# Patient Record
Sex: MALE | Race: BLACK OR AFRICAN AMERICAN | Employment: UNEMPLOYED | URBAN - METROPOLITAN AREA
[De-identification: names, ages, dates, MRNs, and addresses within clinical notes are randomized per-mention and may not be internally consistent; named-entity substitution may affect disease eponyms.]

---

## 2018-09-21 ENCOUNTER — HOSPITAL ENCOUNTER (EMERGENCY)
Age: 48
Discharge: HOME | End: 2018-09-21
Attending: EMERGENCY MEDICINE
Payer: MEDICAID

## 2018-09-21 VITALS
DIASTOLIC BLOOD PRESSURE: 93 MMHG | SYSTOLIC BLOOD PRESSURE: 126 MMHG | HEIGHT: 67 IN | HEART RATE: 88 BPM | OXYGEN SATURATION: 99 % | BODY MASS INDEX: 26.68 KG/M2 | TEMPERATURE: 97.7 F | RESPIRATION RATE: 19 BRPM | WEIGHT: 170 LBS

## 2018-09-21 DIAGNOSIS — G89.29 OTHER CHRONIC PAIN: Primary | ICD-10-CM

## 2018-09-21 DIAGNOSIS — Z76.5 DRUG-SEEKING BEHAVIOR: ICD-10-CM

## 2018-09-21 PROCEDURE — 99283 EMERGENCY DEPT VISIT LOW MDM: CPT

## 2018-09-21 PROCEDURE — 74011250637 HC RX REV CODE- 250/637: Performed by: EMERGENCY MEDICINE

## 2018-09-21 RX ORDER — TRAMADOL HYDROCHLORIDE 50 MG/1
50 TABLET ORAL
Status: COMPLETED | OUTPATIENT
Start: 2018-09-21 | End: 2018-09-21

## 2018-09-21 RX ORDER — TRAMADOL HYDROCHLORIDE 50 MG/1
50 TABLET ORAL
Qty: 10 TAB | Refills: 0 | Status: SHIPPED | OUTPATIENT
Start: 2018-09-21

## 2018-09-21 RX ADMIN — TRAMADOL HYDROCHLORIDE 50 MG: 50 TABLET, FILM COATED ORAL at 12:22

## 2018-09-21 NOTE — DISCHARGE INSTRUCTIONS

## 2018-09-21 NOTE — ED PROVIDER NOTES
EMERGENCY DEPARTMENT HISTORY AND PHYSICAL EXAM 
 
 
Date: 9/21/2018 Patient Name: Moses Hernández History of Presenting Illness Chief Complaint Patient presents with  Pain (Chronic)  
  per pt reports right ankle pain (chronic), denies recent injury. History Provided By: Patient HPI: Moses Hernández, 46 y.o. male with PMHx significant for chronic right ankle pain, presents ambulatory to the ED with cc of chronic right ankle pain for the past 4 years. Pt reports his pain has been worsening recently and he \"needs\" oxycodone for his pain. He reports being seen [facility] ago for the same. He denies any change in his condition and states his current pain is the same as it was for the past 4 years. Pt repeatedly asking for narcotic pain medication in the ED and becomes notably agitated when he was told he would not get oxycodone for his chronic pain. Pt became belligerent for a short period of time and apologized afterwards asking for tramadol. He states \"I need a shot of Toradol and a pill or tramadol today and enough for the next 3 days\". When explained that he can only get a short course of pain medication from the ED pt said, \"well then ill just have to keep coming back for more\". Pt denies any new injury or fall. Social Hx: + Tobacco (everyday smoker), + EtOH (+), - illicit drug use (-) There are no other complaints, changes, or physical findings at this time. PCP: None Past History Past Medical History: 
History reviewed. No pertinent past medical history. Past Surgical History: 
History reviewed. No pertinent surgical history. Family History: 
History reviewed. No pertinent family history. Social History: 
Social History Substance Use Topics  Smoking status: Current Every Day Smoker  Smokeless tobacco: Never Used  Alcohol use Yes Allergies: 
No Known Allergies Review of Systems Review of Systems Constitutional: Negative for chills and fever. HENT: Negative for congestion, rhinorrhea, sneezing and sore throat. Eyes: Negative for redness and visual disturbance. Respiratory: Negative for shortness of breath. Cardiovascular: Negative for chest pain and leg swelling. Gastrointestinal: Negative for abdominal pain, nausea and vomiting. Genitourinary: Negative for difficulty urinating and frequency. Musculoskeletal: Positive for arthralgias (chronic right ankle). Negative for back pain, myalgias and neck stiffness. Skin: Negative for rash. Neurological: Negative for dizziness, syncope, weakness and headaches. Hematological: Negative for adenopathy. All other systems reviewed and are negative. Physical Exam  
Physical Exam  
Constitutional: He is oriented to person, place, and time. He appears well-developed and well-nourished. HENT:  
Head: Normocephalic and atraumatic. Nose: Nose normal.  
Mouth/Throat: Oropharynx is clear and moist.  
Eyes: Conjunctivae and EOM are normal. Pupils are equal, round, and reactive to light. Neck: Normal range of motion. Neck supple. Cardiovascular: Normal rate, regular rhythm, normal heart sounds and intact distal pulses. Pulmonary/Chest: Effort normal and breath sounds normal.  
Abdominal: Soft. Bowel sounds are normal. He exhibits no distension. Musculoskeletal: He exhibits tenderness. He exhibits no deformity. Tenderness to the lateral malleolus of the right ankle with some mild swelling Neurological: He is alert and oriented to person, place, and time. He exhibits normal muscle tone. Skin: Skin is warm and dry. No erythema. Psychiatric: He has a normal mood and affect. His behavior is normal. Judgment and thought content normal.  
 
Medical Decision Making I am the first provider for this patient.  
 
I reviewed the vital signs, available nursing notes, past medical history, past surgical history, family history and social history. Vital Signs-Reviewed the patient's vital signs. Patient Vitals for the past 12 hrs: 
 Temp Pulse Resp BP SpO2  
09/21/18 1115 97.7 °F (36.5 °C) 88 19 (!) 126/93 99 % Records Reviewed: Nursing Notes, Old Medical Records, Previous Radiology Studies and Previous Laboratory Studies Provider Notes (Medical Decision Making): Chronic pain vs. Drug seeking behavior. ED Course:  
Initial assessment performed. The patients presenting problems have been discussed, and they are in agreement with the care plan formulated and outlined with them. I have encouraged them to ask questions as they arise throughout their visit. Critical Care Time:  
None. Disposition: 
DISCHARGE NOTE 
12:13 PM 
The patient has been re-evaluated and is ready for discharge. Reviewed available results with patient. Counseled pt on diagnosis and care plan. Pt has expressed understanding, and all questions have been answered. Pt agrees with plan and agrees to F/U as recommended, or return to the ED if their sxs worsen. Discharge instructions have been provided and explained to the pt, along with reasons to return to the ED. PLAN: 
1. Current Discharge Medication List  
  
START taking these medications Details  
traMADol (ULTRAM) 50 mg tablet Take 1 Tab by mouth every six (6) hours as needed for Pain. Max Daily Amount: 200 mg. Indications: Pain 
Qty: 10 Tab, Refills: 0 Associated Diagnoses: Other chronic pain 2. Follow-up Information Follow up With Details Comments Contact Info None Call  None [phone] Patient stated that they have no PCP 
  
 [facility] EMERGENCY DEPT  As needed, If symptoms worsen [address] Return to ED if worse Diagnosis Clinical Impression: 1. Other chronic pain 2. Drug-seeking behavior Attestations: This note is prepared by Jeovany Ann acting as Scribe for MD Keila Wallace MD : The scribe's documentation has been prepared under my direction and personally reviewed by me in its entirety. I confirm that the note above accurately reflects all work, treatment, procedures, and medical decision making performed by me. This note will not be viewable in [address]
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
## 2018-09-23 ENCOUNTER — APPOINTMENT (OUTPATIENT)
Dept: GENERAL RADIOLOGY | Age: 48
End: 2018-09-23
Attending: EMERGENCY MEDICINE
Payer: MEDICAID

## 2018-09-23 ENCOUNTER — HOSPITAL ENCOUNTER (EMERGENCY)
Age: 48
Discharge: HOME | End: 2018-09-23
Attending: EMERGENCY MEDICINE
Payer: MEDICAID

## 2018-09-23 VITALS
OXYGEN SATURATION: 94 % | HEART RATE: 90 BPM | DIASTOLIC BLOOD PRESSURE: 92 MMHG | HEIGHT: 68 IN | RESPIRATION RATE: 18 BRPM | SYSTOLIC BLOOD PRESSURE: 152 MMHG | TEMPERATURE: 98.6 F | BODY MASS INDEX: 25.01 KG/M2 | WEIGHT: 165 LBS

## 2018-09-23 DIAGNOSIS — G89.29 CHRONIC PAIN OF RIGHT ANKLE: ICD-10-CM

## 2018-09-23 DIAGNOSIS — M25.571 CHRONIC PAIN OF RIGHT ANKLE: ICD-10-CM

## 2018-09-23 DIAGNOSIS — G89.29 CHRONIC PAIN OF RIGHT KNEE: Primary | ICD-10-CM

## 2018-09-23 DIAGNOSIS — M25.561 CHRONIC PAIN OF RIGHT KNEE: Primary | ICD-10-CM

## 2018-09-23 PROCEDURE — 90791 PSYCH DIAGNOSTIC EVALUATION: CPT

## 2018-09-23 PROCEDURE — 99284 EMERGENCY DEPT VISIT MOD MDM: CPT

## 2018-09-23 PROCEDURE — 73562 X-RAY EXAM OF KNEE 3: CPT
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
## 2018-09-23 NOTE — PROGRESS NOTES
Received call regarding pt's reports of homelessness. Chart reviewed. Housing crisis line updated on pt's AVS.  Pt would need to call directly. It is unlikely that pt would receive shelter services this evening. Per [facility] documentation, pt has had 5 ED visits over the past several days to local ED departments: 
 
Sep 23, 2018 [address] [place] [address]. [facility]. [place] [facility] Chief Complaint: right side pain Sep 23, 2018 [facility] [address] [place] [address] Emergency Emergency Sep 22, 2018 [facility] [address] [place] [address] Emergency Emergency Sep 21, 2018 [facility]. [place] [facility] Other chronic pain Malingerer [conscious simulation] Sep 18, 2018 [facility] Emergency Emergency Updated charge nurse that only resource to provide is the following:  Single adults needing overnight shelter are asked to call the [address] [place] [address], [phone] for referral to the appropriate shelter. Cm added the above to AVS. HEMA Burgess

## 2018-09-23 NOTE — ED TRIAGE NOTES
Pt complains of pain from the R knee down to the ankle after a fall today. States he tripped over [facility]. States he was seen at [facility] prior to coming here. Pt also admits, when SAD questions asked, to depression and SI. Denies plan. Note ETOH type smell, pt admits to alcohol use.
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
## 2018-09-24 NOTE — ED NOTES
Discharge instructions given to patient by MD and nurse. Pt asking MD to speak with BSMART. Pt stating that his right foot hurts to apply pressure. Pt has crutches and reminding not to place pressure on right foot.

## 2018-09-24 NOTE — ED NOTES
Spoke with BSMART per patient request. Frank Lara has cleared patient for discharge and patient has been provided with resources.